# Patient Record
Sex: FEMALE | Race: ASIAN | NOT HISPANIC OR LATINO | Employment: UNEMPLOYED | URBAN - METROPOLITAN AREA
[De-identification: names, ages, dates, MRNs, and addresses within clinical notes are randomized per-mention and may not be internally consistent; named-entity substitution may affect disease eponyms.]

---

## 2017-08-18 DIAGNOSIS — Z01.812 PRE-OPERATIVE LABORATORY EXAMINATION: ICD-10-CM

## 2017-08-18 LAB
ALBUMIN SERPL BCP-MCNC: 3.5 G/DL (ref 3.2–4.9)
ALBUMIN/GLOB SERPL: 0.9 G/DL
ALP SERPL-CCNC: 49 U/L (ref 30–99)
ALT SERPL-CCNC: 14 U/L (ref 2–50)
ANION GAP SERPL CALC-SCNC: 8 MMOL/L (ref 0–11.9)
AST SERPL-CCNC: 16 U/L (ref 12–45)
BILIRUB SERPL-MCNC: 0.4 MG/DL (ref 0.1–1.5)
BUN SERPL-MCNC: 19 MG/DL (ref 8–22)
CALCIUM SERPL-MCNC: 9 MG/DL (ref 8.5–10.5)
CHLORIDE SERPL-SCNC: 108 MMOL/L (ref 96–112)
CO2 SERPL-SCNC: 26 MMOL/L (ref 20–33)
CREAT SERPL-MCNC: 0.57 MG/DL (ref 0.5–1.4)
GFR SERPL CREATININE-BSD FRML MDRD: >60 ML/MIN/1.73 M 2
GLOBULIN SER CALC-MCNC: 3.7 G/DL (ref 1.9–3.5)
GLUCOSE SERPL-MCNC: 85 MG/DL (ref 65–99)
POTASSIUM SERPL-SCNC: 3.9 MMOL/L (ref 3.6–5.5)
PROT SERPL-MCNC: 7.2 G/DL (ref 6–8.2)
SODIUM SERPL-SCNC: 142 MMOL/L (ref 135–145)

## 2017-08-18 PROCEDURE — 80053 COMPREHEN METABOLIC PANEL: CPT

## 2017-08-18 PROCEDURE — 36415 COLL VENOUS BLD VENIPUNCTURE: CPT

## 2017-09-12 ENCOUNTER — HOSPITAL ENCOUNTER (OUTPATIENT)
Facility: MEDICAL CENTER | Age: 32
End: 2017-09-12
Attending: OBSTETRICS & GYNECOLOGY | Admitting: OBSTETRICS & GYNECOLOGY
Payer: COMMERCIAL

## 2017-09-12 VITALS
WEIGHT: 106.7 LBS | TEMPERATURE: 97.3 F | HEART RATE: 69 BPM | OXYGEN SATURATION: 95 % | BODY MASS INDEX: 20.95 KG/M2 | RESPIRATION RATE: 16 BRPM | HEIGHT: 60 IN | DIASTOLIC BLOOD PRESSURE: 70 MMHG | SYSTOLIC BLOOD PRESSURE: 101 MMHG

## 2017-09-12 PROBLEM — N70.11 CHRONIC SALPINGITIS: Status: ACTIVE | Noted: 2017-09-12

## 2017-09-12 LAB
B-HCG FREE SERPL-ACNC: <5 MIU/ML
IHCGL IHCGL: NEGATIVE MIU/ML
PATHOLOGY CONSULT NOTE: NORMAL

## 2017-09-12 PROCEDURE — 700111 HCHG RX REV CODE 636 W/ 250 OVERRIDE (IP)

## 2017-09-12 PROCEDURE — 502571 HCHG PACK, LAP CHOLE: Performed by: OBSTETRICS & GYNECOLOGY

## 2017-09-12 PROCEDURE — 84702 CHORIONIC GONADOTROPIN TEST: CPT

## 2017-09-12 PROCEDURE — 88305 TISSUE EXAM BY PATHOLOGIST: CPT

## 2017-09-12 PROCEDURE — 160029 HCHG SURGERY MINUTES - 1ST 30 MINS LEVEL 4: Performed by: OBSTETRICS & GYNECOLOGY

## 2017-09-12 PROCEDURE — A4311 CATHETER W/O BAG 2-WAY LATEX: HCPCS | Performed by: OBSTETRICS & GYNECOLOGY

## 2017-09-12 PROCEDURE — 160002 HCHG RECOVERY MINUTES (STAT): Performed by: OBSTETRICS & GYNECOLOGY

## 2017-09-12 PROCEDURE — A6402 STERILE GAUZE <= 16 SQ IN: HCPCS | Performed by: OBSTETRICS & GYNECOLOGY

## 2017-09-12 PROCEDURE — 500577 HCHG FORCEP, BIPO CUT (EVEREST): Performed by: OBSTETRICS & GYNECOLOGY

## 2017-09-12 PROCEDURE — 501570 HCHG TROCAR, SEPARATOR: Performed by: OBSTETRICS & GYNECOLOGY

## 2017-09-12 PROCEDURE — 160048 HCHG OR STATISTICAL LEVEL 1-5: Performed by: OBSTETRICS & GYNECOLOGY

## 2017-09-12 PROCEDURE — 700102 HCHG RX REV CODE 250 W/ 637 OVERRIDE(OP)

## 2017-09-12 PROCEDURE — 160041 HCHG SURGERY MINUTES - EA ADDL 1 MIN LEVEL 4: Performed by: OBSTETRICS & GYNECOLOGY

## 2017-09-12 PROCEDURE — A9270 NON-COVERED ITEM OR SERVICE: HCPCS

## 2017-09-12 PROCEDURE — 500429 HCHG DRESSING, INTERCEED: Performed by: OBSTETRICS & GYNECOLOGY

## 2017-09-12 PROCEDURE — 501688 HCHG UTERINE MANIPULATOR RUMI: Performed by: OBSTETRICS & GYNECOLOGY

## 2017-09-12 PROCEDURE — 700101 HCHG RX REV CODE 250

## 2017-09-12 PROCEDURE — 501838 HCHG SUTURE GENERAL: Performed by: OBSTETRICS & GYNECOLOGY

## 2017-09-12 PROCEDURE — 160046 HCHG PACU - 1ST 60 MINS PHASE II: Performed by: OBSTETRICS & GYNECOLOGY

## 2017-09-12 PROCEDURE — 160047 HCHG PACU  - EA ADDL 30 MINS PHASE II: Performed by: OBSTETRICS & GYNECOLOGY

## 2017-09-12 PROCEDURE — A4314 CATH W/DRAINAGE 2-WAY LATEX: HCPCS | Performed by: OBSTETRICS & GYNECOLOGY

## 2017-09-12 PROCEDURE — 160036 HCHG PACU - EA ADDL 30 MINS PHASE I: Performed by: OBSTETRICS & GYNECOLOGY

## 2017-09-12 PROCEDURE — 700105 HCHG RX REV CODE 258: Performed by: OBSTETRICS & GYNECOLOGY

## 2017-09-12 PROCEDURE — 160009 HCHG ANES TIME/MIN: Performed by: OBSTETRICS & GYNECOLOGY

## 2017-09-12 PROCEDURE — 160025 RECOVERY II MINUTES (STATS): Performed by: OBSTETRICS & GYNECOLOGY

## 2017-09-12 PROCEDURE — 160035 HCHG PACU - 1ST 60 MINS PHASE I: Performed by: OBSTETRICS & GYNECOLOGY

## 2017-09-12 PROCEDURE — 501582 HCHG TROCAR, THRD BLADED: Performed by: OBSTETRICS & GYNECOLOGY

## 2017-09-12 RX ORDER — ONDANSETRON 2 MG/ML
INJECTION INTRAMUSCULAR; INTRAVENOUS
Status: COMPLETED
Start: 2017-09-12 | End: 2017-09-12

## 2017-09-12 RX ORDER — LIDOCAINE HYDROCHLORIDE 10 MG/ML
INJECTION, SOLUTION INFILTRATION; PERINEURAL
Status: COMPLETED
Start: 2017-09-12 | End: 2017-09-12

## 2017-09-12 RX ORDER — ONDANSETRON 2 MG/ML
4 INJECTION INTRAMUSCULAR; INTRAVENOUS EVERY 6 HOURS PRN
Status: DISCONTINUED | OUTPATIENT
Start: 2017-09-12 | End: 2017-09-12 | Stop reason: HOSPADM

## 2017-09-12 RX ORDER — OXYCODONE HYDROCHLORIDE 5 MG/1
5 TABLET ORAL
Status: DISCONTINUED | OUTPATIENT
Start: 2017-09-12 | End: 2017-09-12 | Stop reason: HOSPADM

## 2017-09-12 RX ORDER — HYDROMORPHONE HYDROCHLORIDE 2 MG/1
4 TABLET ORAL
Status: DISCONTINUED | OUTPATIENT
Start: 2017-09-12 | End: 2017-09-12 | Stop reason: HOSPADM

## 2017-09-12 RX ORDER — KETOROLAC TROMETHAMINE 30 MG/ML
30 INJECTION, SOLUTION INTRAMUSCULAR; INTRAVENOUS EVERY 6 HOURS
Status: DISCONTINUED | OUTPATIENT
Start: 2017-09-12 | End: 2017-09-12 | Stop reason: HOSPADM

## 2017-09-12 RX ORDER — DEXTROSE AND SODIUM CHLORIDE 5; .45 G/100ML; G/100ML
INJECTION, SOLUTION INTRAVENOUS CONTINUOUS
Status: DISCONTINUED | OUTPATIENT
Start: 2017-09-12 | End: 2017-09-12 | Stop reason: HOSPADM

## 2017-09-12 RX ORDER — BUPIVACAINE HYDROCHLORIDE AND EPINEPHRINE 5; 5 MG/ML; UG/ML
INJECTION, SOLUTION EPIDURAL; INTRACAUDAL; PERINEURAL
Status: DISCONTINUED | OUTPATIENT
Start: 2017-09-12 | End: 2017-09-12 | Stop reason: HOSPADM

## 2017-09-12 RX ORDER — SODIUM CHLORIDE, SODIUM LACTATE, POTASSIUM CHLORIDE, CALCIUM CHLORIDE 600; 310; 30; 20 MG/100ML; MG/100ML; MG/100ML; MG/100ML
INJECTION, SOLUTION INTRAVENOUS
Status: DISCONTINUED | OUTPATIENT
Start: 2017-09-12 | End: 2017-09-12 | Stop reason: HOSPADM

## 2017-09-12 RX ADMIN — LIDOCAINE HYDROCHLORIDE 0.2 ML: 10 INJECTION, SOLUTION INFILTRATION; PERINEURAL at 09:40

## 2017-09-12 RX ADMIN — HYDROCODONE BITARTRATE AND ACETAMINOPHEN 15 ML: 7.5; 325 SOLUTION ORAL at 13:20

## 2017-09-12 RX ADMIN — FENTANYL CITRATE 25 MCG: 50 INJECTION, SOLUTION INTRAMUSCULAR; INTRAVENOUS at 12:55

## 2017-09-12 RX ADMIN — HYDROCODONE BITARTRATE AND ACETAMINOPHEN 15 ML: 7.5; 325 SOLUTION ORAL at 12:55

## 2017-09-12 RX ADMIN — FENTANYL CITRATE 25 MCG: 50 INJECTION, SOLUTION INTRAMUSCULAR; INTRAVENOUS at 14:40

## 2017-09-12 RX ADMIN — FENTANYL CITRATE 50 MCG: 50 INJECTION, SOLUTION INTRAMUSCULAR; INTRAVENOUS at 13:15

## 2017-09-12 RX ADMIN — FENTANYL CITRATE 25 MCG: 50 INJECTION, SOLUTION INTRAMUSCULAR; INTRAVENOUS at 13:00

## 2017-09-12 RX ADMIN — SODIUM CHLORIDE, POTASSIUM CHLORIDE, SODIUM LACTATE AND CALCIUM CHLORIDE: 600; 310; 30; 20 INJECTION, SOLUTION INTRAVENOUS at 09:40

## 2017-09-12 RX ADMIN — ONDANSETRON 4 MG: 2 INJECTION INTRAMUSCULAR; INTRAVENOUS at 13:45

## 2017-09-12 ASSESSMENT — PAIN SCALES - GENERAL
PAINLEVEL_OUTOF10: 4
PAINLEVEL_OUTOF10: ASSUMED PAIN PRESENT
PAINLEVEL_OUTOF10: 5
PAINLEVEL_OUTOF10: 6
PAINLEVEL_OUTOF10: 0
PAINLEVEL_OUTOF10: 4
PAINLEVEL_OUTOF10: 8

## 2017-09-12 NOTE — OR NURSING
"1413 patient to stage 2  Patient settled in recliner chair post short ambulation from [facility] - pt dressed with assist by CNA. Pt tearful and reports this from pain not emotions.   1430 Pt reports pain as \"so bad\" but unable to describe or quantify it on pain scale. Placed warm blanket to abdomen for comfort. Pt states she is not able to go home due to pain and nausea. No emesis. Pt reports pain as \"worse\" than nausea; plan to give IV Fentanyl for comfort.   1530 Pt awake and reports \"feeling better\", pain and nausea tolerable. Titrated to RA. Report to EARL Cortez.   1630 Pt sitting up in recliner; states nausea improved and controlled and pain tolerable. Offered patient more time in stage II; pt states ok to leave.     "

## 2017-09-12 NOTE — OR NURSING
1229 To PACU from OR via gurney, sleeping, respirations spontaneous and non-labored via OPA. VSS. Lap sites x2 to abdomen c/d/i.   1240 Assessment unchanged   1242 OPA dc'd at this time. VSS. Pt drowsy but responds to verbal stimuli   1250 Pt more awake at this time. C/O 6/10 pain. Plan to medicate. See MAR   1309 gave report to earl pearl who assumed care of pt.   1345 Received report from EARL Pearl. Pt c/o nausea. Plan to medicate see MAR   1400 Pt sleeping. When roused she states she is having 4/10 pain. Do not plan to medicate at this time.   1413 Pt meets criteria to transfer to stage two. VSS.

## 2017-09-12 NOTE — OR NURSING
0915 PT TO PRE OP TO ASSUME CARE.  0980 Patient allergies and NPO status verified, home medication reconciliation completed and belongings secured. Patient verbalizes understanding of pain scale, expected course of stay and plan of care. Surgical site verified with patient. IV access established.

## 2017-09-12 NOTE — OP REPORT
DATE OF SERVICE:  09/12/2017    PREOPERATIVE DIAGNOSES:  1.  Pelvic pain with right hydrosalpinx.  2.  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    POSTOPERATIVE DIAGNOSES:  1.  Pelvic pain with right hydrosalpinx.  2.  Extensive pelvic adhesions and lower abdominal adhesions and adhesions   around the liver consistent with Mbck-Bnky-Kukqtz syndrome.    PROCEDURES PERFORMED:  Pelviscopy with extensive lysis of adhesions and right   hydrosalpingectomy.    SURGEON:  Nik Baptiste MD    ANESTHESIA:  General.    FINDINGS:  There were extensive adhesions in the pelvis, affixing the right   ovary and tube to the posterior cul-de-sac and the left ovary and tube to the   lateral uterus and posterior rectum.  There were also dense adhesions in the   right lower quadrant.  The appendix was normal.  There were adhesions around   the liver as well.    ESTIMATED BLOOD LOSS:  10 mL.    COMPLICATIONS:  None.    SPECIMENS:  Right hydrosalpinx.    OPERATION IN DETAIL:  After informed consent was obtained, the patient was   brought to the operating room where general anesthesia was administered.  She   was then placed in the dorsal lithotomy position and prepped and draped in   usual sterile fashion.  The cervix was visualized with the speculum, grasped   on the anterior lip with a Otoole tenaculum and cervical os dilated enough to   allow a uterine manipulator into the cavity.  Attention was then turned to the   abdomen.  After cutting a 1 cm incision through which a Veress needle was   advanced, abdomen was insufflated with 3 liters of CO2 gas.  Next, the   laparoscopic trocar and sheath were placed followed by the scope.  The   above-mentioned findings were noted.  A second port was placed in the   suprapubic region by cutting a 5 mm incision through which an accessory port   was placed.  Using these 2 ports, the entire operation was  performed.  The   hook cautery was used to remove the multitude of adhesions.  This took   approximately 2 hours to effectively dissect all of the adhesions and restore   the anatomy to normal.  In the pelvis, the right ovary was completely fixed to   the cul-de-sac, which was extensively lysed freeing up the right ovary and   tube, and then the right tube removed off of the right ovary.  Great care was   taken to maintain blood flow to the right ovary.  On the left side, the tube   and ovary were fixed to the left uterine wall, which was pulled to the left   side and twisted with the bowel coming up behind and fixed in this entire   area.  The entire was lysed freeing up the left adnexa and  the   bowel from the posterior uterus.  Once this was done, chromotubation was   performed noting free dye spill through the left side.  Fimbria itself would   lash.    Once all the adhesions were removed, the instruments were removed and the gas   was allowed to escape.  Hemostasis was noted after which the patient was   placed in the supine position, awoken from general anesthesia and brought to   recovery room in stable condition.       ____________________________________     MD LANIE HSU / MEETA    DD:  09/12/2017 12:35:18  DT:  09/12/2017 13:13:09    D#:  [id number]  Job#:  [id number]

## 2017-09-12 NOTE — DISCHARGE INSTRUCTIONS
Salpingectomy, Care After  Refer to this sheet in the next few weeks. These instructions provide you with information on caring for yourself after your procedure. Your health care provider may also give you more specific instructions. Your treatment has been planned according to current medical practices, but problems sometimes occur. Call your health care provider if you have any problems or questions after your procedure.  WHAT TO EXPECT AFTER THE PROCEDURE  After your procedure, it is typical to have the following:  · Abdominal pain that can be controlled with pain medicine.  · Vaginal spotting.  · Tiredness.  HOME CARE INSTRUCTIONS  · Get plenty of rest and sleep.  · Only take over-the-counter or prescription medicines as directed by your health care provider.  · Keep incision areas clean and dry. Remove or change any bandages (dressings) only as directed by your health care provider.  · You may resume your regular diet. Eat a well-balanced diet.  · Drink enough fluids to keep your urine clear or pale yellow.  · Limit exercise and activities as directed by your health care provider. Do not lift anything heavier than 5 lb (2.3 kg) until your health care provider approves.  · Do not drive until your health care provider approves.  · Do not have sexual intercourse until your health care provider says it is okay.  · Take your temperature twice a day for the first week. Write those temperatures down.  · Follow up with your health care provider as directed.  SEEK MEDICAL CARE IF:  · You have pain when you urinate.  · You see pus coming out of the incision, or the incision is .  · You have increasing abdominal pain.  · You have swelling or redness in the incision area.  · You develop a rash.  · You feel lightheaded.  · You have pain that is not controlled with medicine.  SEEK IMMEDIATE MEDICAL CARE IF:  · You develop a fever.  · You have increasing abdominal pain.  · You develop chest or leg pain.  · You  develop shortness of breath.  · You pass out.     This information is not intended to replace advice given to you by your health care provider. Make sure you discuss any questions you have with your health care provider.     Document Released: 03/23/2012 Document Revised: 01/08/2016 Document Reviewed: 06/11/2014  [employer] Patient Education ©2016 [employer]    ACTIVITY: Rest and take it easy for the first 24 hours.  A responsible adult is recommended to remain with you during that time.  It is normal to feel sleepy.  We encourage you to not do anything that requires balance, judgment or coordination.    MILD FLU-LIKE SYMPTOMS ARE NORMAL. YOU MAY EXPERIENCE GENERALIZED MUSCLE ACHES, THROAT IRRITATION, HEADACHE AND/OR SOME NAUSEA.    FOR 24 HOURS DO NOT:  Drive, operate machinery or run household appliances.  Drink beer or alcoholic beverages.   Make important decisions or sign legal documents.    SPECIAL INSTRUCTIONS: Follow up with Dr. Baptiste's office     DIET: To avoid nausea, slowly advance diet as tolerated, avoiding spicy or greasy foods for the first day.  Add more substantial food to your diet according to your physician's instructions.  Babies can be fed formula or breast milk as soon as they are hungry.  INCREASE FLUIDS AND FIBER TO AVOID CONSTIPATION.    SURGICAL DRESSING/BATHING:     FOLLOW-UP APPOINTMENT:  A follow-up appointment should be arranged with your doctor ; call to schedule.    You should CALL YOUR PHYSICIAN if you develop:  Fever greater than 101 degrees F.  Pain not relieved by medication, or persistent nausea or vomiting.  Excessive bleeding (blood soaking through dressing) or unexpected drainage from the wound.  Extreme redness or swelling around the incision site, drainage of pus or foul smelling drainage.  Inability to urinate or empty your bladder within 8 hours.  Problems with breathing or chest pain.    You should call 911 if you develop problems with breathing or chest  pain.  If you are unable to contact your doctor or surgical center, you should go to the nearest emergency room or urgent care center.  Physician's telephone #: DR. NORRIS [phone]    If any questions arise, call your doctor.  If your doctor is not available, please feel free to call the Surgical Center at [phone].  The Center is open Monday through Friday from 7AM to 7PM.  You can also call the HEALTH HOTLINE open 24 hours/day, 7 days/week and speak to a nurse at [phone], or toll free at [phone].    A registered nurse may call you a few days after your surgery to see how you are doing after your procedure.    MEDICATIONS: Resume taking daily medication.  Take prescribed pain medication with food.  If no medication is prescribed, you may take non-aspirin pain medication if needed.  PAIN MEDICATION CAN BE VERY CONSTIPATING.  Take a stool softener or laxative such as senokot, pericolace, or milk of magnesia if needed.    Prescription given for NA .  Last pain medication given at 1:20 pm     If your physician has prescribed pain medication that includes Acetaminophen (Tylenol), do not take additional Acetaminophen (Tylenol) while taking the prescribed medication.    Depression / Suicide Risk    As you are discharged from this [facility] facility, it is important to learn how to keep safe from harming yourself.    Recognize the warning signs:  · Abrupt changes in personality, positive or negative- including increase in energy   · Giving away possessions  · Change in eating patterns- significant weight changes-  positive or negative  · Change in sleeping patterns- unable to sleep or sleeping all the time   · Unwillingness or inability to communicate  · Depression  · Unusual sadness, discouragement and loneliness  · Talk of wanting to die  · Neglect of personal appearance   · Rebelliousness- reckless behavior  · Withdrawal from people/activities they love  · Confusion- inability to concentrate     If  you or a loved one observes any of these behaviors or has concerns about self-harm, here's what you can do:  · Talk about it- your feelings and reasons for harming yourself  · Remove any means that you might use to hurt yourself (examples: pills, rope, extension cords, firearm)  · Get professional help from the community (Mental Health, Substance Abuse, psychological counseling)  · Do not be alone:Call your Safe Contact- someone whom you trust who will be there for you.  · Call your local CRISIS HOTLINE 396-1808 or 498-804-7422  · Call your local Children's Mobile Crisis Response Team [place] (083) 927-8860 or www.ScoreGrid  · Call the toll free National Suicide Prevention Hotlines   · National Suicide Prevention Lifeline 163-079-QLIF (7090)  · National Hope Line Network 800-SUICIDE (543-5831)

## 2017-09-12 NOTE — DISCHARGE SUMMARY
CHIEF COMPLAINT ON ADMISSION  No chief complaint on file.      CODE STATUS  Full Code    HPI & HOSPITAL COURSE  This is a 31 y.o. female here with pelvic pain.      Therefore, she is discharged in stable condition with close outpatient follow-up.    SPECIFIC OUTPATIENT FOLLOW-UP  F/U in 2 weeks    DISCHARGE PROBLEM LIST  Active Problems:    Chronic salpingitis POA: Unknown  Resolved Problems:    * No resolved hospital problems. *      FOLLOW UP  No future appointments.  No follow-up provider specified.    MEDICATIONS ON DISCHARGE   Suzi Yap   Home Medication Instructions ANGÉLICA:[id number]    Printed on:17 1240   Medication Information                      Ascorbic Acid (VITAMIN C PO)  Take  by mouth as needed. Powder in water             Inulin (FIBER CHOICE FRUITY BITES PO)  Take 1 Tab by mouth as needed.             NON SPECIFIED  Take 1 Tab by mouth every day. One a day pre felipe and similac prenatal vitamin- alternates days                 DIET  Orders Placed This Encounter   Procedures   • DIET ORDER     Standing Status:   Standing     Number of Occurrences:   1     Order Specific Question:   Diet:     Answer:   Regular [1]       ACTIVITY  normal        CONSULTATIONS  none    PROCEDURES  Laparoscopy with lysis of adhesions, removal of Right hydrosalpinx    LABORATORY  Lab Results   Component Value Date/Time    SODIUM 142 2017 10:29 AM    POTASSIUM 3.9 2017 10:29 AM    CHLORIDE 108 2017 10:29 AM    CO2 26 2017 10:29 AM    GLUCOSE 85 2017 10:29 AM    BUN 19 2017 10:29 AM    CREATININE 0.57 2017 10:29 AM        No results found for: WBC, HEMOGLOBIN, HEMATOCRIT, PLATELETCT

## 2017-09-12 NOTE — OR NURSING
1530 REPORT RECEIVED FROM April RN TO ASSUME CARE OF THIS PT. 1625 PT AMBULATES TO RESTROOM VOID. PT VOIDS WITHOUT DIFFICULTY. 4 SMALL SANG CLOTS IN TOILET.  BRODERICK PAD WITH MODERATE SANG. DRAINAGE. CHANGE BRODERICK PAD. PT REPORTS MILD NAUSEA. 1630 REPORT GIVEN TO April RN TO RESUME CARE OF THIS PT.

## 2018-02-05 ENCOUNTER — HOSPITAL ENCOUNTER (OUTPATIENT)
Dept: LAB | Facility: MEDICAL CENTER | Age: 33
End: 2018-02-05
Attending: OBSTETRICS & GYNECOLOGY
Payer: COMMERCIAL

## 2018-02-05 LAB
ESTRADIOL SERPL-MCNC: 1305 PG/ML
PROGEST SERPL-MCNC: 37.83 NG/ML

## 2018-02-05 PROCEDURE — 82670 ASSAY OF TOTAL ESTRADIOL: CPT

## 2018-02-05 PROCEDURE — 36415 COLL VENOUS BLD VENIPUNCTURE: CPT

## 2018-02-05 PROCEDURE — 84144 ASSAY OF PROGESTERONE: CPT

## 2018-07-29 ENCOUNTER — HOSPITAL ENCOUNTER (OUTPATIENT)
Dept: HOSPITAL 8 - LDOP | Age: 33
Setting detail: OBSERVATION
Discharge: HOME | End: 2018-07-29
Attending: OBSTETRICS & GYNECOLOGY | Admitting: OBSTETRICS & GYNECOLOGY
Payer: COMMERCIAL

## 2018-07-29 VITALS — BODY MASS INDEX: 25.97 KG/M2 | WEIGHT: 132.28 LBS | HEIGHT: 60 IN

## 2018-07-29 DIAGNOSIS — Z3A.28: ICD-10-CM

## 2018-07-29 DIAGNOSIS — O46.93: Primary | ICD-10-CM

## 2018-07-29 LAB
BASOPHILS # BLD AUTO: 0.06 X10^3/UL (ref 0–0.1)
BASOPHILS NFR BLD AUTO: 1 % (ref 0–1)
EOSINOPHIL # BLD AUTO: 0.1 X10^3/UL (ref 0–0.4)
EOSINOPHIL NFR BLD AUTO: 1 % (ref 1–7)
ERYTHROCYTE [DISTWIDTH] IN BLOOD BY AUTOMATED COUNT: 12.9 % (ref 9.6–15.2)
LYMPHOCYTES # BLD AUTO: 2.85 X10^3/UL (ref 1–3.4)
LYMPHOCYTES NFR BLD AUTO: 24 % (ref 22–44)
MCH RBC QN AUTO: 31 PG (ref 27–34.8)
MCHC RBC AUTO-ENTMCNC: 34.7 G/DL (ref 32.4–35.8)
MCV RBC AUTO: 89.3 FL (ref 80–100)
MD: NO
MONOCYTES # BLD AUTO: 0.61 X10^3/UL (ref 0.2–0.8)
MONOCYTES NFR BLD AUTO: 5 % (ref 2–9)
NEUTROPHILS # BLD AUTO: 8.45 X10^3/UL (ref 1.8–6.8)
NEUTROPHILS NFR BLD AUTO: 70 % (ref 42–75)
PLATELET # BLD AUTO: 226 X10^3/UL (ref 130–400)
PMV BLD AUTO: 7 FL (ref 7.4–10.4)
RBC # BLD AUTO: 4.3 X10^6/UL (ref 3.82–5.3)

## 2018-07-29 PROCEDURE — 76805 OB US >/= 14 WKS SNGL FETUS: CPT

## 2018-07-29 PROCEDURE — 85025 COMPLETE CBC W/AUTO DIFF WBC: CPT

## 2018-07-29 PROCEDURE — 86850 RBC ANTIBODY SCREEN: CPT

## 2018-07-29 PROCEDURE — 59025 FETAL NON-STRESS TEST: CPT

## 2018-07-29 PROCEDURE — G0378 HOSPITAL OBSERVATION PER HR: HCPCS

## 2018-07-29 PROCEDURE — 86900 BLOOD TYPING SEROLOGIC ABO: CPT

## 2018-07-29 PROCEDURE — 36415 COLL VENOUS BLD VENIPUNCTURE: CPT

## 2018-08-10 ENCOUNTER — NON-PROVIDER VISIT (OUTPATIENT)
Dept: HEALTH INFORMATION MANAGEMENT | Facility: MEDICAL CENTER | Age: 33
End: 2018-08-10
Payer: COMMERCIAL

## 2018-08-10 VITALS — HEIGHT: 60 IN | BODY MASS INDEX: 26.13 KG/M2 | WEIGHT: 133.1 LBS

## 2018-08-10 DIAGNOSIS — O24.419 GESTATIONAL DIABETES MELLITUS (GDM) IN THIRD TRIMESTER, GESTATIONAL DIABETES METHOD OF CONTROL UNSPECIFIED: ICD-10-CM

## 2018-08-10 PROCEDURE — G0109 DIAB MANAGE TRN IND/GROUP: HCPCS | Performed by: INTERNAL MEDICINE

## 2018-08-10 NOTE — LETTER
August 10, 2018                   Re: Suzi Yap     1985         [id number]     DALILA Urban  [address]  [place] [address]    Dear :Anita Munoz A.P.N.    On 8/10/2018, your patient Suzi Yap, received 2 hours of nutrition training from the [facility] at [facility] for management of her gestational diabetes.  Her EDC is Estimated Date of Delivery: None noted..  We taught the following subjects:    Patient was provided with a 1800 calorie meal plan with 3 meals and 3 snacks.  Meal plan contains ~180 grams of carbohydrates per day.   Importance of meal planning in diabetes management during pregnancy  Importance of consistent timing of meals and snacks and agreed upon times  Avoidance of simple carbohydrates  Metabolism of food components relating to pregnancy  Identification of foods in food groups  Patient demonstrates adequate ability to utilize meal planning manual for reference  Plan 3 meals and 3 snacks with 90% accuracy  Review basic principles of eating out  Reviewed precautions with artificial sweeteners    Comments:  Suzi agreed to follow the meal plan and to eat at the times agreed upon.  She will check blood glucose 4 times a day and record the values in her log book.     Suzi Yap was encouraged to discuss this further with you.    Hopefully this will help in your management of her care.  If we can be of further assistance, please feel free to call.    Thank you for the referral.    Sincerely,Salima Ellison, RD, LD, CDE  [phone]   Certified Diabetes Educator

## 2018-08-10 NOTE — PROGRESS NOTES
Suzi received a 1800 calorie meal plan (based on 30 kcal/kg of current weight) consisting of 3 meals and 3 snacks (see media for copy of meal plan). Pt was educated on carbohydrate containing foods vs non carbohydrate containing foods, the importance of small frequent meals, limiting carbohydrate to 1 serving in the morning, no fruit before noon/12pm, and avoiding all concentrated sweets for the remainder of her pregnancy. Explained the importance of not going >4hrs btwn eating during the day and no longer than 10hours overnight. Patient agrees to follow the meal plan and guidelines provided.

## 2018-08-10 NOTE — PROGRESS NOTES
Suzi came to the Gestational Diabetes program.  She states she is on bedrest for placenta previa.  She denies any family history of diabetes.  She was supplied an One Touch Verio Flex meter.  She was able to do a return demonstration without difficulty. She will keep walking and stay well hydrated with water. Her meal plan is scanned into Media and her Doctor Letters with what was taught can be found under the Letters tab.

## 2018-08-10 NOTE — LETTER
August 10, 2018                   Re: Suzi Yap     1985         [id number]       [address]ALILA Urban  [address]  [place] [address]      Dear :Anita Munoz A.P.N.    On 8/10/2018, your patient Suzi Yap, received 1 hour of nurse training from the [facility] at [facility] for management of her gestational diabetes.  Her Estimated Date of Delivery: 10/19/18.  We taught the following subjects:    Introduction to gestational diabetes, benefits and responsibilities of patient, physiology of diabetes and the diease process, benefits of blood glucose monitoring and record keeping, medication action and possible side effects, hypoglycemia, sick day management, exercise, stress reduction and travel with diabetes.       Nurse assessment / Education:    Comments:    Edema:no      Weight:Weight: 60.4 kg (133 lb 1.6 oz)         Complaints:yes - bedrest for placenta previa.        Pathophysiology of diabetes in pregnancy    Discuss  potential maternal and fetal complications in pregnancy with diabetes.     Importance of blood glucose monitoring   Proper testing technique using a One Touch Verio Flex meter.    At 2:30 pm, the meter read 100, which was 4.5 hours after eating.  Testing: fasting and one hour after meals,  expected ranges and rationale for strict control.     Ketone test today:no       Recognition and treatment of hypoglycemia.     Insulin taught: No  Insulin briefly dicussed at this time.    Should patient require insulin later in pregnancy, she would need further education.   Reviewed fetal kick counts and other tests to determine fetal well-being  Discuss benefits and risks of exercise in pregnancy  Discuss when to call Doctor  Discuss sick day care  Importance of wearing diabetes identification      Patient/caregiver appeared to understand the content as demonstrated by appropriate questions.     Suzi Yap was encouraged to discuss this further with  you.    Hopefully this will help in your management of her care.  If we can be of further assistance, please feel free to call.    Thank you for the referral.    Sincerely,      Kindra Syed RN CDE  [facility] CLASS  Certified Diabetes Educator

## (undated) DEVICE — HUMID-VENT HEAT AND MOISTURE EXCHANGE- (50/BX)

## (undated) DEVICE — SPONGE GAUZESTER. 2X2 4-PL - (2/PK 50PK/BX 30BX/CS)

## (undated) DEVICE — TRAY BLADDER CARE W/ 16 FR FOLEY CATHETER STATLOCK  (10/CA)

## (undated) DEVICE — TRAY SRGPRP PVP IOD WT PRP - (20/CA)

## (undated) DEVICE — PROTECTOR ULNA NERVE - (36PR/CA)

## (undated) DEVICE — GOWN WARMING STANDARD FLEX - (30/CA)

## (undated) DEVICE — KIT ANESTHESIA W/CIRCUIT & 3/LT BAG W/FILTER (20EA/CA)

## (undated) DEVICE — MASK ANESTHESIA ADULT  - (100/CA)

## (undated) DEVICE — DRAPE LAP PELVISCOPY - (10/CA)

## (undated) DEVICE — GLOVE BIOGEL INDICATOR SZ 6.5 SURGICAL PF LTX - (50PR/BX 4BX/CA)

## (undated) DEVICE — DRAPE UNDER BUTTOCK LRG (40/CA)

## (undated) DEVICE — TROCAR 5X100 NON BLADED Z-TH - READ KII (6/BX)

## (undated) DEVICE — SODIUM CHL IRRIGATION 0.9% 1000ML (12EA/CA)

## (undated) DEVICE — SUTURE 4-0 VICRYL PLUS FS-2 - 27 INCH (36/BX)

## (undated) DEVICE — TUBE E-T HI-LO CUFF 7.0MM (10EA/PK)

## (undated) DEVICE — SUTURE 0 VICRYL PLUS UR-6 - 27 INCH (36/BX)

## (undated) DEVICE — GLOVE BIOGEL SZ 6.5 SURGICAL PF LTX (50PR/BX 4BX/CA)

## (undated) DEVICE — BRIEF STRETCH MATERNITY M/L - FITS 20-60IN (5EA/BG 20BG/CA)

## (undated) DEVICE — WATER IRRIGATION STERILE 1000ML (12EA/CA)

## (undated) DEVICE — Device

## (undated) DEVICE — SET SUCTION/IRRIGATION WITH DISPOSABLE TIP (6/CA )PART #0250-070-520 IS A SUB

## (undated) DEVICE — SENSOR SPO2 NEO LNCS ADHESIVE (20/BX) SEE USER NOTES

## (undated) DEVICE — GLOVE BIOGEL ECLIPSE PF LATEX SIZE 8.0  (50PR/BX)

## (undated) DEVICE — ELECTRODE 850 FOAM ADHESIVE - HYDROGEL RADIOTRNSPRNT (50/PK)

## (undated) DEVICE — TROCAR Z THREAD 11 X 100 - BLADED (6/BX)

## (undated) DEVICE — SUCTION INSTRUMENT YANKAUER BULBOUS TIP W/O VENT (50EA/CA)

## (undated) DEVICE — CANISTER SUCTION RIGID RED 1500CC (40EA/CA)

## (undated) DEVICE — KIT ROOM DECONTAMINATION

## (undated) DEVICE — NEPTUNE 4 PORT MANIFOLD - (20/PK)

## (undated) DEVICE — SYRINGE SAFETY 10 ML 18 GA X 1 1/2 BLUNT LL (100/BX 4BX/CA)

## (undated) DEVICE — FORCEP BIPO CUTTING (EVEREST) - 5MM (5EA/BX)

## (undated) DEVICE — TUBE CONNECTING SUCTION - CLEAR PLASTIC STERILE 72 IN (50EA/CA)

## (undated) DEVICE — DRESSING TRANSPARENT FILM TEGADERM 2.375 X 2.75"  (100EA/BX)"

## (undated) DEVICE — LEGGING LITHOTOMY 31 X 48 IN - (2EA/PK 20PK/CA)

## (undated) DEVICE — HEAD HOLDER JUNIOR/ADULT

## (undated) DEVICE — TUBING INSUFFLATION - (10/BX)

## (undated) DEVICE — LACTATED RINGERS INJ 1000 ML - (14EA/CA 60CA/PF)

## (undated) DEVICE — TRAY CATHETER FOLEY URINE METER W/STATLOCK 350ML (10EA/CA)

## (undated) DEVICE — GLOVE BIOGEL SZ 8 SURGICAL PF LTX - (50PR/BX 4BX/CA)

## (undated) DEVICE — GLOVE, LITE (PAIR)

## (undated) DEVICE — BAG, SPONGE COUNT 50600

## (undated) DEVICE — DRESSING INTERCEED ABSORBABLE ADHESION BARRIER TC7 (10EA/CA)

## (undated) DEVICE — PAD SANITARY 11IN MAXI IND WRAPPED  (12EA/PK 24PK/CA)

## (undated) DEVICE — ELECTRODE DUAL RETURN W/ CORD - (50/PK)

## (undated) DEVICE — SUTURE GENERAL